# Patient Record
Sex: MALE | Race: WHITE
[De-identification: names, ages, dates, MRNs, and addresses within clinical notes are randomized per-mention and may not be internally consistent; named-entity substitution may affect disease eponyms.]

---

## 2020-08-11 ENCOUNTER — HOSPITAL ENCOUNTER (EMERGENCY)
Dept: HOSPITAL 11 - JP.ED | Age: 70
LOS: 1 days | Discharge: NURSING, REHAB OR LONG-TERM CARE | End: 2020-08-12
Payer: MEDICARE

## 2020-08-11 DIAGNOSIS — R07.89: Primary | ICD-10-CM

## 2020-08-11 DIAGNOSIS — I25.2: ICD-10-CM

## 2020-08-11 DIAGNOSIS — Z91.040: ICD-10-CM

## 2020-08-11 DIAGNOSIS — Z88.1: ICD-10-CM

## 2020-08-11 DIAGNOSIS — K21.9: ICD-10-CM

## 2020-08-11 DIAGNOSIS — J44.9: ICD-10-CM

## 2020-08-11 DIAGNOSIS — I25.10: ICD-10-CM

## 2020-08-11 DIAGNOSIS — Z79.02: ICD-10-CM

## 2020-08-11 DIAGNOSIS — Z88.8: ICD-10-CM

## 2020-08-11 DIAGNOSIS — F17.210: ICD-10-CM

## 2020-08-11 PROCEDURE — 80053 COMPREHEN METABOLIC PANEL: CPT

## 2020-08-11 PROCEDURE — 96372 THER/PROPH/DIAG INJ SC/IM: CPT

## 2020-08-11 PROCEDURE — 96374 THER/PROPH/DIAG INJ IV PUSH: CPT

## 2020-08-11 PROCEDURE — 84484 ASSAY OF TROPONIN QUANT: CPT

## 2020-08-11 PROCEDURE — 71045 X-RAY EXAM CHEST 1 VIEW: CPT

## 2020-08-11 PROCEDURE — 93005 ELECTROCARDIOGRAM TRACING: CPT

## 2020-08-11 PROCEDURE — 99285 EMERGENCY DEPT VISIT HI MDM: CPT

## 2020-08-11 PROCEDURE — 36415 COLL VENOUS BLD VENIPUNCTURE: CPT

## 2020-08-11 PROCEDURE — 85025 COMPLETE CBC W/AUTO DIFF WBC: CPT

## 2020-08-11 PROCEDURE — 83690 ASSAY OF LIPASE: CPT

## 2020-08-11 PROCEDURE — 93010 ELECTROCARDIOGRAM REPORT: CPT

## 2020-08-11 PROCEDURE — 71260 CT THORAX DX C+: CPT

## 2020-08-11 PROCEDURE — 83605 ASSAY OF LACTIC ACID: CPT

## 2020-08-11 NOTE — EDM.PDOC
ED HPI GENERAL MEDICAL PROBLEM





- General


Chief Complaint: General


Stated Complaint: MEDICAL VIA NORTH


Time Seen by Provider: 08/11/20 20:36


Source of Information: Reports: Patient, RN Notes Reviewed


History Limitations: Reports: Uncooperative





- History of Present Illness


INITIAL COMMENTS - FREE TEXT/NARRATIVE: 





70-year-old gentleman presents emergency department today for evaluation, he was

sent over from the nursing home for difficulty arouse eating.  He states to the 

nursing staff that he just wanted to take a nap.  He admits to me that he has 

left-sided chest pain he is a very difficult historian difficult to extract 

information from.  History of recent myocardial infarction and he is a DNR/DNI 

he has been refusing his medications, and I asked him about this he states he 

just does not want to take him,





He does admit that he slipped when transferring from the wheelchair to the 

commode when he slipped the right side of his rib cage hit the arm of the 

wheelchair


  ** Right Chest


Pain Score (Numeric/FACES): 5





- Related Data


                                    Allergies











Allergy/AdvReac Type Severity Reaction Status Date / Time


 


Latex, Natural Rubber Allergy  Other Verified 08/11/20 20:10


 


levofloxacin Allergy  Other Verified 08/11/20 20:10


 


lisinopril Allergy  Other Verified 08/11/20 20:10


 


potassium Allergy  Other Verified 08/11/20 20:10











Home Meds: 


                                    Home Meds





Albuterol Sulfate [Albuterol Sulfate Hfa] 8.5 gm IH Q4H PRN 06/21/20 [History]


Budesonide/Formoterol [Symbicort 80-4.5 MCG] 2 puff INH BID 06/21/20 [History]


Clopidogrel Bisulfate [Plavix] 75 mg PO DAILY 06/21/20 [History]


Finasteride [Proscar] 5 mg PO DAILY 06/21/20 [History]


Omeprazole 20 mg PO BID 06/21/20 [History]


Sennosides/Docusate Sodium [Senna Plus 8.6-50 mg Tablet] 2 tab PO DAILY 06/21/20

[History]


Tamsulosin [Tamsulosin 24 Hr] 0.4 mg PO DAILY 06/21/20 [History]


Tiotropium [Spiriva] 18 mcg INH DAILY 06/21/20 [History]


atorvaSTATin [Lipitor] 80 mg PO BEDTIME 06/21/20 [History]


Acetaminophen [Tylenol Extra Strength] 2 tab PO TID 08/11/20 [History]


oxyCODONE 1 tab PO Q4H 08/11/20 [History]


polyethylene glycoL 3350 [MiraLAX] 1 packet PO DAILY 08/11/20 [History]











Past Medical History


Cardiovascular History: Reports: CAD, MI


Respiratory History: Reports: COPD


Gastrointestinal History: Reports: GERD, Other (See Below)


Other Gastrointestinal History: esophagitis.  Cachexia.  malnutrition


Genitourinary History: Reports: Retention, Urinary


Other Genitourinary History: has chronic indwelling catheter


Endocrine/Metabolic History: Reports: Hypomagnesemia


Hematologic History: Reports: Anemia, Anticoagulation Therapy, B12 Deficiency





- Past Surgical History


GI Surgical History: Reports: Appendectomy, Cholecystectomy, Small Bowel, Other 

(See Below)


Other GI Surgeries/Procedures: SBR





Social & Family History





- Family History


Family Medical History: Noncontributory





- Tobacco Use


Smoking Status *Q: Current Every Day Smoker


Years of Tobacco use: 50


Packs/Tins Daily: 0.5





- Caffeine Use


Caffeine Use: Reports: None





- Recreational Drug Use


Recreational Drug Use: No





ED ROS GENERAL





- Review of Systems


Review Of Systems: See Below (Not cooperative)


Constitutional: Reports: No Symptoms


HEENT: Reports: No Symptoms


Respiratory: Reports: No Symptoms


Cardiovascular: Reports: Chest Pain


GI/Abdominal: Reports: No Symptoms


Musculoskeletal: Reports: Other (Chest wall pain after trauma)





ED EXAM, GENERAL





- Physical Exam


Exam: See Below


Exam Limited By: Uncooperative


General Appearance: Alert, No Apparent Distress


Respiratory/Chest: No Respiratory Distress, Lungs Clear, Normal Breath Sounds, 

No Accessory Muscle Use, Chest Non-Tender


Cardiovascular: Regular Rate, Rhythm, No Murmur


GI/Abdominal: Soft, Non-Tender


Extremities: No Pedal Edema





Course





- Vital Signs


Last Recorded V/S: 


                                Last Vital Signs











Temp  98.1 F   08/11/20 20:19


 


Pulse  85   08/11/20 22:36


 


Resp  22 H  08/11/20 22:36


 


BP  119/53 L  08/11/20 22:36


 


Pulse Ox  96   08/11/20 22:36














- Orders/Labs/Meds


Orders: 


                               Active Orders 24 hr











 Category Date Time Status


 


 Cardiac Monitoring [RC] .As Directed Care  08/11/20 20:41 Active


 


 EKG Documentation Completion [RC] ASDIRECTED Care  08/11/20 20:42 Active


 


 Chest 1V Frontal [CR] Stat Exams  08/11/20 20:42 Taken


 


 Iopamidol [Isovue-300 (61%)] Med  08/11/20 21:45 Active





 100 ml IV .AS DIRECTED   


 


 Sodium Chloride 0.9% [Normal Saline] 80 ml Med  08/11/20 21:45 Active





 IV ASDIRECTED   


 


 Sodium Chloride 0.9% [Saline Flush] Med  08/11/20 21:35 Active





 10 ml FLUSH ASDIRECTED PRN   


 


 fentaNYL [Sublimaze] Med  08/11/20 23:10 Once





 50 mcg IVPUSH ONETIME ONE   


 


 EKG 12 Lead [EK] Stat Ther  08/11/20 20:41 Ordered








                                Medication Orders





Sodium Chloride (Normal Saline)  80 mls @ 3 mls/sec IV ASDIRECTED RUTH


   Last Admin: 08/11/20 21:58  Dose: 3 mls/sec


   Documented by: CAROL ANN


Iopamidol (Isovue-300 (61%))  100 ml IV .AS DIRECTED RUTH


   Last Admin: 08/11/20 21:58  Dose: 100 ml


   Documented by: CAROL ANN


Sodium Chloride (Saline Flush)  10 ml FLUSH ASDIRECTED PRN


   PRN Reason: Keep Vein Open


   Last Admin: 08/11/20 21:58  Dose: 10 ml


   Documented by: CAROL ANN








Labs: 


                                Laboratory Tests











  08/11/20 08/11/20 08/11/20 Range/Units





  20:54 20:54 20:54 


 


WBC  10.4    (4.5-11.0)  K/uL


 


RBC  4.36    (4.30-5.90)  M/uL


 


Hgb  12.6    (12.0-15.0)  g/dL


 


Hct  39.5 L    (40.0-54.0)  %


 


MCV  91    (80-98)  fL


 


MCH  29    (27-31)  pg


 


MCHC  32    (32-36)  %


 


Plt Count  376    (150-400)  K/uL


 


Neut % (Auto)  70 H    (36-66)  %


 


Lymph % (Auto)  12 L    (24-44)  %


 


Mono % (Auto)  11 H    (2-6)  %


 


Eos % (Auto)  6 H    (2-4)  %


 


Baso % (Auto)  1    (0-1)  %


 


Sodium   139 L   (140-148)  mmol/L


 


Potassium   4.3   (3.6-5.2)  mmol/L


 


Chloride   104   (100-108)  mmol/L


 


Carbon Dioxide   28   (21-32)  mmol/L


 


Anion Gap   11.3   (5.0-14.0)  mmol/L


 


BUN   15   (7-18)  mg/dL


 


Creatinine   0.9   (0.8-1.3)  mg/dL


 


Est Cr Clr Drug Dosing   53.03   mL/min


 


Estimated GFR (MDRD)   > 60   (>60)  


 


Glucose   117 H   ()  mg/dL


 


Lactic Acid    0.9  (0.4-2.0)  mmol/L


 


Calcium   8.5   (8.5-10.1)  mg/dL


 


Total Bilirubin   0.5   (0.2-1.0)  mg/dL


 


AST   14 L   (15-37)  U/L


 


ALT   18   (12-78)  U/L


 


Alkaline Phosphatase   93   ()  U/L


 


Troponin I   < 0.017   (0.000-0.056)  ng/mL


 


Total Protein   6.8   (6.4-8.2)  g/dL


 


Albumin   3.0 L   (3.4-5.0)  g/dL


 


Globulin   3.8 H   (2.3-3.5)  g/dL


 


Albumin/Globulin Ratio   0.8 L   (1.2-2.2)  


 


Lipase   37 L   ()  U/L











Meds: 


Medications











Generic Name Dose Route Start Last Admin





  Trade Name Freq  PRN Reason Stop Dose Admin


 


Sodium Chloride  80 mls @ 3 mls/sec  08/11/20 21:45  08/11/20 21:58





  Normal Saline  IV   3 mls/sec





  ASDIRECTED RUTH   Administration


 


Iopamidol  100 ml  08/11/20 21:45  08/11/20 21:58





  Isovue-300 (61%)  IV   100 ml





  .AS DIRECTED RUTH   Administration


 


Sodium Chloride  10 ml  08/11/20 21:35  08/11/20 21:58





  Saline Flush  FLUSH   10 ml





  ASDIRECTED PRN   Administration





  Keep Vein Open  














Discontinued Medications














Generic Name Dose Route Start Last Admin





  Trade Name Freq  PRN Reason Stop Dose Admin


 


Fentanyl  50 mcg  08/11/20 20:58  08/11/20 21:09





  Sublimaze  IM  08/11/20 20:59  50 mcg





  ONETIME ONE   Administration


 


Fentanyl  50 mcg  08/11/20 23:10 





  Sublimaze  IVPUSH  08/11/20 23:11 





  ONETIME ONE  














Departure





- Departure


Time of Disposition: 23:12


Disposition: DC/[facility]


Condition: Poor


Clinical Impression: 


 Chest wall pain








- Discharge Information


Instructions:  Nonspecific Chest Pain, Adult


Referrals: 


Emeka Lovett MD [Primary Care Provider] - 


Forms:  ED Department Discharge


Additional Instructions: 


Use hydrocodone as needed for pain control,  please followup with your primary 

care provider in 3-5 days if not better, please call return to the emergency 

department with worsening of symptoms.





Sepsis Event Note (ED)





- Evaluation


Sepsis Screening Result: No Definite Risk





- Focused Exam


Vital Signs: 


                                   Vital Signs











  Temp Pulse Resp BP Pulse Ox


 


 08/11/20 22:36   85  22 H  119/53 L  96


 


 08/11/20 22:00   75  19  120/61  93 L


 


 08/11/20 20:33   74   103/50 L  94 L


 


 08/11/20 20:19  98.1 F  76  12  116/44 L  95


 


 08/11/20 19:59  98.1 F  76  12  116/44 L  95














- My Orders


Last 24 Hours: 


My Active Orders





08/11/20 20:41


Cardiac Monitoring [RC] .As Directed 


EKG 12 Lead [EK] Stat 





08/11/20 20:42


EKG Documentation Completion [RC] ASDIRECTED 


Chest 1V Frontal [CR] Stat 





08/11/20 21:35


Sodium Chloride 0.9% [Saline Flush]   10 ml FLUSH ASDIRECTED PRN 





08/11/20 21:45


Iopamidol [Isovue-300 (61%)]   100 ml IV .AS DIRECTED 


Sodium Chloride 0.9% [Normal Saline] 80 ml IV ASDIRECTED 





08/11/20 23:10


fentaNYL [Sublimaze]   50 mcg IVPUSH ONETIME ONE 














- Assessment/Plan


Last 24 Hours: 


My Active Orders





08/11/20 20:41


Cardiac Monitoring [RC] .As Directed 


EKG 12 Lead [EK] Stat 





08/11/20 20:42


EKG Documentation Completion [RC] ASDIRECTED 


Chest 1V Frontal [CR] Stat 





08/11/20 21:35


Sodium Chloride 0.9% [Saline Flush]   10 ml FLUSH ASDIRECTED PRN 





08/11/20 21:45


Iopamidol [Isovue-300 (61%)]   100 ml IV .AS DIRECTED 


Sodium Chloride 0.9% [Normal Saline] 80 ml IV ASDIRECTED 





08/11/20 23:10


fentaNYL [Sublimaze]   50 mcg IVPUSH ONETIME ONE 











Plan: 





Assessment





Acuity = acute





Site and laterality = chest wall pain





Etiology  = secondary to trauma





Manifestations = none





Location of injury =  Home





Lab values = CBC, CMP, troponin all within normal limits CT scan of the chest 

does show pulmonary nodule which informs me does not know however no rib 

fractures are appreciated





Plan


Discharged home with hydrocodone 5/325 1 tab p.o. 3 times daily PRN total #6 he 

will be transferred back to the nursing home

















 This note was dictated using dragon voice recognition software please call with

 any questions on syntax or grammar.

## 2020-08-11 NOTE — CRLCT
INDICATION:



Fall with right-sided rib pain



TECHNIQUE:



CT chest was acquired with 100 cc Isovue-300 IV contrast.



COMPARISON:



None



FINDINGS:



Cardiovascular structures: Heart size is normal.  Thoracic aorta and main 

pulmonary artery are normal in caliber.    



Mediastinum and yessenia: No mass or adenopathy.  



Lungs: Emphysema. 1.2 x 1.1 cm pulmonary nodule in the right upper lobe. 

Ill-defined patchy opacity in the left upper lobe. 



Pleura and pericardium: No effusions.  



Chest wall and axilla: No mass or adenopathy.  



Upper abdomen: Unremarkable.  



Bones: Stimulator device in the thoracic spinal canal. 



IMPRESSION:



1.2 cm pulmonary nodule in the right upper lobe. Recommend PET-CT for 

further evaluation. 



Patchy opacity in the left upper lobe may represent infection.



Please note that all CT scans at this facility use dose modulation, 

iterative reconstruction, and/or weight-based dosing when appropriate to 

reduce radiation dose to as low as reasonably achievable.



Dictated by Valeria Barbosa MD @ Aug 11 2020 10:44PM



Signed by Dr. Valeria Barbosa @ Aug 11 2020 10:50PM

## 2020-08-13 NOTE — CR
CHEST: Portable 8/11/2020 at 9:11 PM

 

CLINICAL HISTORY:Right-sided rib pain

 

COMPARISON:None

 

FINDINGS:  Heart size is normal. There are atherosclerotic changes in the

aorta.. The lungs are hyperaerated. There is patchy density in the left the

lower lobe.

 

Impression: Changes of COPD

 

Patchy left lower lobe density may represent pneumonic infiltrate